# Patient Record
Sex: FEMALE | Race: BLACK OR AFRICAN AMERICAN | NOT HISPANIC OR LATINO | Employment: FULL TIME | URBAN - METROPOLITAN AREA
[De-identification: names, ages, dates, MRNs, and addresses within clinical notes are randomized per-mention and may not be internally consistent; named-entity substitution may affect disease eponyms.]

---

## 2024-05-18 ENCOUNTER — OFFICE VISIT (OUTPATIENT)
Dept: URGENT CARE | Facility: CLINIC | Age: 49
End: 2024-05-18
Payer: COMMERCIAL

## 2024-05-18 VITALS
TEMPERATURE: 98 F | SYSTOLIC BLOOD PRESSURE: 142 MMHG | HEART RATE: 89 BPM | BODY MASS INDEX: 32.96 KG/M2 | DIASTOLIC BLOOD PRESSURE: 88 MMHG | WEIGHT: 210 LBS | OXYGEN SATURATION: 97 % | HEIGHT: 67 IN | RESPIRATION RATE: 18 BRPM

## 2024-05-18 DIAGNOSIS — R52 BODY ACHES: ICD-10-CM

## 2024-05-18 DIAGNOSIS — J32.9 SINUSITIS, UNSPECIFIED CHRONICITY, UNSPECIFIED LOCATION: ICD-10-CM

## 2024-05-18 DIAGNOSIS — J02.9 SORE THROAT: Primary | ICD-10-CM

## 2024-05-18 LAB
CTP QC/QA: YES
CTP QC/QA: YES
MOLECULAR STREP A: NEGATIVE
POC MOLECULAR INFLUENZA A AGN: NEGATIVE
POC MOLECULAR INFLUENZA B AGN: NEGATIVE

## 2024-05-18 PROCEDURE — 87502 INFLUENZA DNA AMP PROBE: CPT | Mod: QW,,,

## 2024-05-18 PROCEDURE — 99213 OFFICE O/P EST LOW 20 MIN: CPT | Mod: ,,,

## 2024-05-18 PROCEDURE — 87651 STREP A DNA AMP PROBE: CPT | Mod: QW,,,

## 2024-05-18 RX ORDER — METOPROLOL SUCCINATE 50 MG/1
50 TABLET, EXTENDED RELEASE ORAL
COMMUNITY

## 2024-05-18 RX ORDER — DOXYCYCLINE 100 MG/1
100 CAPSULE ORAL EVERY 12 HOURS
Qty: 14 CAPSULE | Refills: 0 | Status: SHIPPED | OUTPATIENT
Start: 2024-05-18 | End: 2024-05-25

## 2024-05-18 RX ORDER — PREDNISONE 20 MG/1
20 TABLET ORAL DAILY
Qty: 5 TABLET | Refills: 0 | Status: SHIPPED | OUTPATIENT
Start: 2024-05-18 | End: 2024-05-23

## 2024-05-18 RX ORDER — HYDROCHLOROTHIAZIDE 12.5 MG/1
12.5 TABLET ORAL
COMMUNITY

## 2024-05-18 RX ORDER — HYDROXYZINE PAMOATE 25 MG/1
25-50 CAPSULE ORAL NIGHTLY PRN
COMMUNITY
Start: 2024-04-28

## 2024-05-18 RX ORDER — PROMETHAZINE HYDROCHLORIDE AND DEXTROMETHORPHAN HYDROBROMIDE 6.25; 15 MG/5ML; MG/5ML
5 SYRUP ORAL EVERY 6 HOURS PRN
Qty: 100 ML | Refills: 0 | Status: SHIPPED | OUTPATIENT
Start: 2024-05-18 | End: 2024-05-23

## 2024-05-18 RX ORDER — AMLODIPINE BESYLATE 10 MG/1
10 TABLET ORAL
COMMUNITY

## 2024-05-18 NOTE — PROGRESS NOTES
"Subjective:      Patient ID: Kina Paige is a 49 y.o. female.    Vitals:  height is 5' 7" (1.702 m) and weight is 95.3 kg (210 lb). Her oral temperature is 98.3 °F (36.8 °C). Her blood pressure is 142/88 (abnormal) and her pulse is 89. Her respiration is 18 and oxygen saturation is 97%.     Chief Complaint: Generalized Body Aches     Patient is a 49 y.o. female who presents to urgent care with complaints of sore throat, fatigue and body aches x 1 days.Pt states she started having loss of voice and sinus congestion x 6 days ago Alleviating factors include Nyquil with no relief. Patient denies SOB. Pt states she was seen by PCP on 05/14/2024 and was given a steroid shot at that time. She denies any fever, wheezing, hx of asthma, sob, cp, n/v/d, abdominal complaints, rash, difficulty swallowing, neck stiffness, or changes in intake or output.      Constitution: Positive for fatigue. Negative for chills and fever.   HENT:  Positive for congestion, postnasal drip and voice change. Negative for sore throat and trouble swallowing.    Eyes: Negative.    Respiratory:  Negative for cough, shortness of breath, wheezing and asthma.    Allergic/Immunologic: Negative for asthma.      Objective:     Physical Exam   Constitutional: She is oriented to person, place, and time. She appears well-developed. She is cooperative.  Non-toxic appearance. She does not appear ill. No distress.   HENT:   Head: Normocephalic and atraumatic.   Ears:   Right Ear: Hearing, tympanic membrane and external ear normal.   Left Ear: Hearing, tympanic membrane and external ear normal.   Nose: Congestion present.   Mouth/Throat: Mucous membranes are normal. Mucous membranes are moist. Posterior oropharyngeal erythema (clear pnd) present. Oropharynx is clear.   Eyes: Conjunctivae and lids are normal.   Neck: Trachea normal and phonation normal. Neck supple. No edema present. No erythema present. No neck rigidity present.   Cardiovascular: Normal rate. " "  Pulmonary/Chest: Effort normal and breath sounds normal. No stridor. No respiratory distress. She has no decreased breath sounds. She has no wheezes. She has no rhonchi. She has no rales.   Abdominal: Normal appearance.   Neurological: She is alert and oriented to person, place, and time. She exhibits normal muscle tone. Coordination normal.   Skin: Skin is warm, dry, intact, not diaphoretic and no rash. Capillary refill takes less than 2 seconds.   Psychiatric: Her speech is normal and behavior is normal. Mood normal.   Nursing note and vitals reviewed.         Previous History      Review of patient's allergies indicates:   Allergen Reactions    Lisinopril Anaphylaxis    Penicillin Anaphylaxis       Past Medical History:   Diagnosis Date    Hypertension      Current Outpatient Medications   Medication Instructions    amLODIPine (NORVASC) 10 mg, Oral    doxycycline (MONODOX) 100 mg, Oral, Every 12 hours    hydroCHLOROthiazide (HYDRODIURIL) 12.5 mg, Oral    hydrOXYzine pamoate (VISTARIL) 25-50 mg, Oral, Nightly PRN    metoprolol succinate (TOPROL-XL) 50 mg, Oral    predniSONE (DELTASONE) 20 mg, Oral, Daily    promethazine-dextromethorphan (PROMETHAZINE-DM) 6.25-15 mg/5 mL Syrp 5 mLs, Oral, Every 6 hours PRN     History reviewed. No pertinent surgical history.  No family history on file.    Social History     Tobacco Use    Smoking status: Never     Passive exposure: Never    Smokeless tobacco: Never        Physical Exam      Vital Signs Reviewed   BP (!) 142/88   Pulse 89   Temp 98.3 °F (36.8 °C) (Oral)   Resp 18   Ht 5' 7" (1.702 m)   Wt 95.3 kg (210 lb)   SpO2 97%   BMI 32.89 kg/m²        Procedures    Procedures     Labs   No results found for this or any previous visit.    Assessment:     1. Sore throat    2. Body aches    3. Sinusitis, unspecified chronicity, unspecified location        Plan:       Sore throat  -     POCT Strep A, Molecular    Body aches  -     POCT Influenza A/B " Molecular    Sinusitis, unspecified chronicity, unspecified location    Other orders  -     doxycycline (MONODOX) 100 MG capsule; Take 1 capsule (100 mg total) by mouth every 12 (twelve) hours. for 7 days  Dispense: 14 capsule; Refill: 0  -     predniSONE (DELTASONE) 20 MG tablet; Take 1 tablet (20 mg total) by mouth once daily. for 5 days  Dispense: 5 tablet; Refill: 0  -     promethazine-dextromethorphan (PROMETHAZINE-DM) 6.25-15 mg/5 mL Syrp; Take 5 mLs by mouth every 6 (six) hours as needed (cough).  Dispense: 100 mL; Refill: 0      Flu and strep negative   Start the antibiotics today, take with food, take to completion   Start the oral steroids today   Caution with the cough medication as it may cause sedation, do not drive or drink alcohol while taking it.   Start taking an allergy pill daily such as claritin, zyrtec, allegrea or xyzal. Also start using a nasal steroid spray such as flonase or nasacort daily.   Coricidin HBP as needed for severe congestion.    Monitor for fever. Take tylenol/acetaminophen or ibuprofen as needed. Rest and hydrate. If symptoms persist or worsen, return to clinic or seek medical attention immediately.                  psych MD Michael

## 2024-05-18 NOTE — PATIENT INSTRUCTIONS
Flu and strep negative   Start the antibiotics today, take with food, take to completion   Start the oral steroids today   Caution with the cough medication as it may cause sedation, do not drive or drink alcohol while taking it.   Start taking an allergy pill daily such as claritin, zyrtec, allegrea or xyzal. Also start using a nasal steroid spray such as flonase or nasacort daily.   Coricidin HBP as needed for severe congestion.    Monitor for fever. Take tylenol/acetaminophen or ibuprofen as needed. Rest and hydrate. If symptoms persist or worsen, return to clinic or seek medical attention immediately.

## 2024-08-05 ENCOUNTER — OFFICE VISIT (OUTPATIENT)
Dept: URGENT CARE | Facility: CLINIC | Age: 49
End: 2024-08-05
Payer: COMMERCIAL

## 2024-08-05 VITALS
TEMPERATURE: 99 F | DIASTOLIC BLOOD PRESSURE: 85 MMHG | RESPIRATION RATE: 18 BRPM | HEART RATE: 98 BPM | SYSTOLIC BLOOD PRESSURE: 136 MMHG | OXYGEN SATURATION: 96 %

## 2024-08-05 DIAGNOSIS — J06.9 VIRAL URI: Primary | ICD-10-CM

## 2024-08-05 DIAGNOSIS — R05.9 COUGH, UNSPECIFIED TYPE: ICD-10-CM

## 2024-08-05 LAB
CTP QC/QA: YES
SARS-COV-2 AG RESP QL IA.RAPID: NEGATIVE

## 2024-08-05 PROCEDURE — 99213 OFFICE O/P EST LOW 20 MIN: CPT | Mod: ,,, | Performed by: NURSE PRACTITIONER

## 2024-08-05 PROCEDURE — 87811 SARS-COV-2 COVID19 W/OPTIC: CPT | Mod: QW,,, | Performed by: NURSE PRACTITIONER

## 2024-08-05 RX ORDER — AZELASTINE 1 MG/ML
1 SPRAY, METERED NASAL 2 TIMES DAILY
Qty: 30 ML | Refills: 1 | Status: SHIPPED | OUTPATIENT
Start: 2024-08-05 | End: 2024-09-04

## 2024-08-05 RX ORDER — PREDNISONE 20 MG/1
20 TABLET ORAL 2 TIMES DAILY
Qty: 6 TABLET | Refills: 0 | Status: SHIPPED | OUTPATIENT
Start: 2024-08-05 | End: 2024-08-08

## 2024-08-05 RX ORDER — POTASSIUM CHLORIDE 750 MG/1
TABLET, EXTENDED RELEASE ORAL
COMMUNITY
Start: 2023-10-07

## 2025-04-15 ENCOUNTER — RESULTS FOLLOW-UP (OUTPATIENT)
Dept: OTHER | Facility: CLINIC | Age: 50
End: 2025-04-15